# Patient Record
Sex: FEMALE | Race: WHITE
[De-identification: names, ages, dates, MRNs, and addresses within clinical notes are randomized per-mention and may not be internally consistent; named-entity substitution may affect disease eponyms.]

---

## 2021-02-19 ENCOUNTER — HOSPITAL ENCOUNTER (OUTPATIENT)
Dept: HOSPITAL 79 - LAB | Age: 71
End: 2021-02-19
Attending: CLINIC/CENTER
Payer: COMMERCIAL

## 2021-02-19 DIAGNOSIS — E78.2: ICD-10-CM

## 2021-02-19 DIAGNOSIS — N18.30: ICD-10-CM

## 2021-02-19 DIAGNOSIS — I12.9: Primary | ICD-10-CM

## 2021-02-19 LAB — BUN/CREATININE RATIO: 25 (ref 0–10)

## 2021-02-23 LAB
HDL SIZE: 8.8 NM (ref 9.2–?)
HDL-C: 36 MG/DL (ref 39–?)
HDL-P (TOTAL): 27.7 UMOL/L (ref 30.5–?)
LARGE HDL-P: 2.1 UMOL/L (ref 4.8–?)
LARGE VLDL-P: 2.5 NMOL/L (ref ?–2.7)
LDL SIZE: 21.9 NM (ref 20.8–?)
LDL-C: 105 MG/DL (ref 0–99)
LDL-P: 1075 NMOL/L (ref ?–1000)
LP-IR SCORE: 42 (ref ?–45)
SMALL LDL-P: 221 NMOL/L (ref ?–527)
TRIGLYCERIDES: 86 MG/DL (ref 0–149)
VLDL SIZE: 42.7 NM (ref ?–46.6)

## 2021-03-05 ENCOUNTER — HOSPITAL ENCOUNTER (OUTPATIENT)
Dept: HOSPITAL 79 - LAB | Age: 71
End: 2021-03-05
Attending: NURSE PRACTITIONER
Payer: COMMERCIAL

## 2021-03-05 DIAGNOSIS — R22.41: Primary | ICD-10-CM

## 2021-03-05 DIAGNOSIS — M25.561: ICD-10-CM

## 2021-03-05 DIAGNOSIS — M79.661: ICD-10-CM

## 2021-03-05 LAB
BUN/CREATININE RATIO: 15 (ref 0–10)
HGB BLD-MCNC: 14.1 GM/DL (ref 12.3–15.3)
RED BLOOD COUNT: 4.45 M/UL (ref 4–5.1)
WHITE BLOOD COUNT: 5.7 K/UL (ref 4.5–11)

## 2021-03-12 ENCOUNTER — HOSPITAL ENCOUNTER (OUTPATIENT)
Dept: HOSPITAL 79 - KOH-I | Age: 71
End: 2021-03-12
Attending: OTOLARYNGOLOGY
Payer: COMMERCIAL

## 2021-03-12 DIAGNOSIS — J32.9: Primary | ICD-10-CM

## 2021-03-12 DIAGNOSIS — J34.89: ICD-10-CM

## 2021-03-12 DIAGNOSIS — J34.2: ICD-10-CM

## 2021-06-17 ENCOUNTER — HOSPITAL ENCOUNTER (OUTPATIENT)
Dept: HOSPITAL 79 - LAB | Age: 71
End: 2021-06-17
Attending: CLINIC/CENTER
Payer: COMMERCIAL

## 2021-06-17 DIAGNOSIS — R53.83: ICD-10-CM

## 2021-06-17 DIAGNOSIS — E03.8: ICD-10-CM

## 2021-06-17 DIAGNOSIS — I12.9: Primary | ICD-10-CM

## 2021-06-17 DIAGNOSIS — D51.8: ICD-10-CM

## 2021-06-17 DIAGNOSIS — E55.9: ICD-10-CM

## 2021-06-17 DIAGNOSIS — N18.30: ICD-10-CM

## 2021-06-17 DIAGNOSIS — Z88.2: ICD-10-CM

## 2021-06-17 DIAGNOSIS — E78.2: ICD-10-CM

## 2021-06-17 LAB
BUN/CREATININE RATIO: 21 (ref 0–10)
HGB BLD-MCNC: 14.4 GM/DL (ref 12.3–15.3)
RED BLOOD COUNT: 4.48 M/UL (ref 4–5.1)
WHITE BLOOD COUNT: 5.6 K/UL (ref 4.5–11)

## 2021-06-19 LAB
HDL SIZE: 8.9 NM (ref 9.2–?)
HDL-C: 42 MG/DL (ref 39–?)
HDL-P (TOTAL): 32.1 UMOL/L (ref 30.5–?)
LARGE HDL-P: 2.9 UMOL/L (ref 4.8–?)
LARGE VLDL-P: 1.6 NMOL/L (ref ?–2.7)
LDL SIZE: 21.5 NM (ref 20.5–?)
LDL-C: 97 MG/DL (ref 0–99)
LDL-P: 1183 NMOL/L (ref ?–1000)
LP-IR SCORE: 41 (ref ?–45)
SMALL LDL-P: 499 NMOL/L (ref ?–527)
TRIGLYCERIDES: 55 MG/DL (ref 0–149)
VLDL SIZE: 43.1 NM (ref ?–46.6)

## 2021-09-16 ENCOUNTER — HOSPITAL ENCOUNTER (OUTPATIENT)
Dept: HOSPITAL 79 - KOH-I | Age: 71
End: 2021-09-16
Attending: CLINIC/CENTER
Payer: COMMERCIAL

## 2021-09-16 DIAGNOSIS — R13.10: Primary | ICD-10-CM

## 2021-09-16 DIAGNOSIS — Z88.2: ICD-10-CM

## 2022-02-03 NOTE — NUR
WHEN PT BACK FROM [facility], RADIAL AND GROIN ACCESS SITES ASSESSED. RADIAL HAD
TR BAND IN TACT. CDI. RIGHT GROIN SOFT, CLEAN, WNL. WCTM.

## 2022-02-03 NOTE — NUR
WENT TO RELEASE 5 MORE ML OF AIR FROM TR BAND. SCANT AMOUNT OF BLOOD WAS NOTED
AT SITE. REINFLATED BY 5 ML PER STANDARD WCTM.

## 2022-02-03 NOTE — NUR
CALLED DR VERGARA TO GIVE U/S RESULTS. NO PVD NOTED IN IMPRESSION. ALSO DISCUSSED
PT CONCERN FOR "LOWER THAN NORMAL BP". SBP IS IN THE 90'S, PT ASYMPTOMATIC, NO
ISSUES. DR VERGARA STATED THIS IS OK JUST ENCOURAGE PT TO STAY HYDRATED. RADIAL
SITE DISCUSSED WITH HIM AS WELL. BRUISING IS OK AS THERE IS NO HEMATOMA
PRESENT AND THE BRUISING HAS CEASED. SITE COVERED WITH 2X2 AND LARGE BANDAID.
GROIN SITE CDI.